# Patient Record
Sex: FEMALE | Race: WHITE | Employment: UNEMPLOYED | URBAN - METROPOLITAN AREA
[De-identification: names, ages, dates, MRNs, and addresses within clinical notes are randomized per-mention and may not be internally consistent; named-entity substitution may affect disease eponyms.]

---

## 2017-04-24 ENCOUNTER — OFFICE VISIT (OUTPATIENT)
Dept: FAMILY MEDICINE CLINIC | Facility: CLINIC | Age: 27
End: 2017-04-24

## 2017-04-24 VITALS
HEART RATE: 94 BPM | OXYGEN SATURATION: 98 % | RESPIRATION RATE: 16 BRPM | TEMPERATURE: 98 F | WEIGHT: 266.38 LBS | SYSTOLIC BLOOD PRESSURE: 110 MMHG | BODY MASS INDEX: 46.61 KG/M2 | HEIGHT: 63.25 IN | DIASTOLIC BLOOD PRESSURE: 80 MMHG

## 2017-04-24 DIAGNOSIS — Z00.00 ROUTINE HEALTH MAINTENANCE: Primary | ICD-10-CM

## 2017-04-24 DIAGNOSIS — D68.4 BLOOD CLOTTING FACTOR DEFICIENCY DISORDER (HCC): ICD-10-CM

## 2017-04-24 DIAGNOSIS — E66.9 NON MORBID OBESITY, UNSPECIFIED OBESITY TYPE: ICD-10-CM

## 2017-04-24 DIAGNOSIS — D22.9 ATYPICAL NEVUS: ICD-10-CM

## 2017-04-24 PROCEDURE — 99395 PREV VISIT EST AGE 18-39: CPT | Performed by: FAMILY MEDICINE

## 2017-04-24 NOTE — PROGRESS NOTES
HPI:   Malena Fernandes is a 27year old female who presents for a complete physical exam. Symptoms: denies discharge, itching, burning or dysuria. Patient complains of having issues with  A mole on the left side of her head it does not bleed.  And also has exertion  CARDIOVASCULAR: denies chest pain on exertion  GI: denies abdominal pain,denies heartburn  : denies dysuria, vaginal discharge or itching,periods regular   MUSCULOSKELETAL: occasional low  back pain, and has right calf pain  NEURO: denies heada CPX in 1 year  Routine health maintenance  (primary encounter diagnosis)  Atypical nevus  Blood clotting factor deficiency disorder (hcc)  Non morbid obesity, unspecified obesity type      Orders Placed This Encounter  CBC, Platelet, No Differential [E]  C

## 2017-04-27 ENCOUNTER — TELEPHONE (OUTPATIENT)
Dept: FAMILY MEDICINE CLINIC | Facility: CLINIC | Age: 27
End: 2017-04-27

## 2017-04-27 ENCOUNTER — NURSE ONLY (OUTPATIENT)
Dept: FAMILY MEDICINE CLINIC | Facility: CLINIC | Age: 27
End: 2017-04-27

## 2017-04-27 DIAGNOSIS — D22.9 ATYPICAL NEVUS: Primary | ICD-10-CM

## 2017-04-27 DIAGNOSIS — Z00.00 ROUTINE HEALTH MAINTENANCE: ICD-10-CM

## 2017-04-27 PROCEDURE — 80053 COMPREHEN METABOLIC PANEL: CPT | Performed by: FAMILY MEDICINE

## 2017-04-27 PROCEDURE — 84443 ASSAY THYROID STIM HORMONE: CPT | Performed by: FAMILY MEDICINE

## 2017-04-27 PROCEDURE — 85027 COMPLETE CBC AUTOMATED: CPT | Performed by: FAMILY MEDICINE

## 2017-04-27 PROCEDURE — 36415 COLL VENOUS BLD VENIPUNCTURE: CPT | Performed by: FAMILY MEDICINE

## 2017-04-27 PROCEDURE — 80061 LIPID PANEL: CPT | Performed by: FAMILY MEDICINE

## 2017-07-03 ENCOUNTER — LAB SERVICES (OUTPATIENT)
Dept: OTHER | Age: 27
End: 2017-07-03

## 2017-07-07 LAB — PATH REPORT: NORMAL

## 2017-07-11 ENCOUNTER — MED REC SCAN ONLY (OUTPATIENT)
Dept: FAMILY MEDICINE CLINIC | Facility: CLINIC | Age: 27
End: 2017-07-11

## 2017-10-18 ENCOUNTER — MED REC SCAN ONLY (OUTPATIENT)
Dept: FAMILY MEDICINE CLINIC | Facility: CLINIC | Age: 27
End: 2017-10-18

## 2017-10-18 ENCOUNTER — IMMUNIZATION (OUTPATIENT)
Dept: FAMILY MEDICINE CLINIC | Facility: CLINIC | Age: 27
End: 2017-10-18

## 2017-10-18 DIAGNOSIS — Z23 NEED FOR VACCINATION: ICD-10-CM

## 2017-10-18 PROCEDURE — 90471 IMMUNIZATION ADMIN: CPT | Performed by: FAMILY MEDICINE

## 2017-10-18 PROCEDURE — 90686 IIV4 VACC NO PRSV 0.5 ML IM: CPT | Performed by: FAMILY MEDICINE

## 2018-04-04 ENCOUNTER — OFFICE VISIT (OUTPATIENT)
Dept: FAMILY MEDICINE CLINIC | Facility: CLINIC | Age: 28
End: 2018-04-04

## 2018-04-04 VITALS
WEIGHT: 266.19 LBS | HEART RATE: 93 BPM | HEIGHT: 64 IN | OXYGEN SATURATION: 99 % | BODY MASS INDEX: 45.44 KG/M2 | TEMPERATURE: 99 F | RESPIRATION RATE: 26 BRPM | DIASTOLIC BLOOD PRESSURE: 72 MMHG | SYSTOLIC BLOOD PRESSURE: 120 MMHG

## 2018-04-04 DIAGNOSIS — Z3A.30 30 WEEKS GESTATION OF PREGNANCY: ICD-10-CM

## 2018-04-04 DIAGNOSIS — R09.81 SINUS CONGESTION: ICD-10-CM

## 2018-04-04 DIAGNOSIS — J06.9 VIRAL URI: Primary | ICD-10-CM

## 2018-04-04 PROCEDURE — 99213 OFFICE O/P EST LOW 20 MIN: CPT | Performed by: FAMILY MEDICINE

## 2018-04-04 NOTE — PROGRESS NOTES
HPI:   Andrea Sewell is a 32year old female who presents for upper respiratory symptoms for  4  days. Patient reports sore throat, congestion, clear colored nasal discharge, low grade fever.  Both her sons are sick with viral URI, denies any significant Visit:    No prescriptions requested or ordered in this encounter       Imaging & Consults:  None

## 2018-04-13 ENCOUNTER — HOSPITAL ENCOUNTER (OUTPATIENT)
Facility: HOSPITAL | Age: 28
Setting detail: OBSERVATION
Discharge: HOME | End: 2018-04-13
Attending: OBSTETRICS & GYNECOLOGY | Admitting: OBSTETRICS & GYNECOLOGY
Payer: COMMERCIAL

## 2018-04-13 VITALS
RESPIRATION RATE: 18 BRPM | HEIGHT: 64 IN | SYSTOLIC BLOOD PRESSURE: 123 MMHG | HEART RATE: 79 BPM | DIASTOLIC BLOOD PRESSURE: 70 MMHG | TEMPERATURE: 98 F

## 2018-04-13 PROBLEM — Z34.90 PREGNANCY (HCC): Status: ACTIVE | Noted: 2018-04-13

## 2018-04-13 PROBLEM — Z34.90 PREGNANCY: Status: ACTIVE | Noted: 2018-04-13

## 2018-04-13 PROCEDURE — 80053 COMPREHEN METABOLIC PANEL: CPT | Performed by: OBSTETRICS & GYNECOLOGY

## 2018-04-13 PROCEDURE — 82150 ASSAY OF AMYLASE: CPT | Performed by: OBSTETRICS & GYNECOLOGY

## 2018-04-13 PROCEDURE — 59025 FETAL NON-STRESS TEST: CPT

## 2018-04-13 PROCEDURE — 96374 THER/PROPH/DIAG INJ IV PUSH: CPT

## 2018-04-13 PROCEDURE — 83690 ASSAY OF LIPASE: CPT | Performed by: OBSTETRICS & GYNECOLOGY

## 2018-04-13 PROCEDURE — 81001 URINALYSIS AUTO W/SCOPE: CPT | Performed by: OBSTETRICS & GYNECOLOGY

## 2018-04-13 PROCEDURE — 81002 URINALYSIS NONAUTO W/O SCOPE: CPT

## 2018-04-13 PROCEDURE — 85025 COMPLETE CBC W/AUTO DIFF WBC: CPT | Performed by: OBSTETRICS & GYNECOLOGY

## 2018-04-13 RX ORDER — ONDANSETRON 2 MG/ML
4 INJECTION INTRAMUSCULAR; INTRAVENOUS EVERY 4 HOURS PRN
Status: DISCONTINUED | OUTPATIENT
Start: 2018-04-13 | End: 2018-04-13

## 2018-04-13 RX ORDER — DEXTROSE, SODIUM CHLORIDE, SODIUM LACTATE, POTASSIUM CHLORIDE, AND CALCIUM CHLORIDE 5; .6; .31; .03; .02 G/100ML; G/100ML; G/100ML; G/100ML; G/100ML
INJECTION, SOLUTION INTRAVENOUS CONTINUOUS
Status: DISCONTINUED | OUTPATIENT
Start: 2018-04-13 | End: 2018-04-13

## 2018-04-13 NOTE — PROGRESS NOTES
2nd urine dip, negative for glucose, dean, ketones,blood,protein,   sp gravity <1.005, ph 6.5, uro 0.2, nit and lec negative.  Pt home ambulatory in no apparent discomfort with her d/c instructions and zofran prescription

## 2018-04-13 NOTE — NST
Nonstress Test   Patient: Elvira Zepeda    Gestation: 33w0d    NST:       Variability: Moderate           Accelerations: Yes           Decelerations: None            Baseline: 130 BPM           Uterine Irritability: No           Contractions: Not presen

## 2018-04-13 NOTE — PROGRESS NOTES
Pt states that \"I feel fine, even the upper abdominal pain is gone since the zofran\" Pt held down the [employer] and iv infusing well at present.  Pt desires discharge home and has already made an appt with Dr Magdaleno Sims for Monday April 16th at 5:30 pm   P

## 2018-04-13 NOTE — PROGRESS NOTES
Pt here with complaints of nausea, vomitting and diarhea since 0600 today. Pt states vomitted \"bile at least 10 times and has loose stool 4-6 times today\"  Pt denies any vaginal bleeding or discharge but does have upper abdominal discomfort.   Unable to l

## 2018-05-31 ENCOUNTER — TELEPHONE (OUTPATIENT)
Dept: OBGYN UNIT | Facility: HOSPITAL | Age: 28
End: 2018-05-31

## 2018-06-03 ENCOUNTER — APPOINTMENT (OUTPATIENT)
Dept: OBGYN CLINIC | Facility: HOSPITAL | Age: 28
End: 2018-06-03
Payer: COMMERCIAL

## 2018-06-03 ENCOUNTER — HOSPITAL ENCOUNTER (INPATIENT)
Facility: HOSPITAL | Age: 28
LOS: 2 days | Discharge: HOME | End: 2018-06-05
Attending: OBSTETRICS & GYNECOLOGY | Admitting: OBSTETRICS & GYNECOLOGY
Payer: COMMERCIAL

## 2018-06-03 PROCEDURE — 86077 PHYS BLOOD BANK SERV XMATCH: CPT | Performed by: OBSTETRICS & GYNECOLOGY

## 2018-06-03 PROCEDURE — 86780 TREPONEMA PALLIDUM: CPT | Performed by: OBSTETRICS & GYNECOLOGY

## 2018-06-03 PROCEDURE — 86701 HIV-1ANTIBODY: CPT | Performed by: OBSTETRICS & GYNECOLOGY

## 2018-06-03 PROCEDURE — 86901 BLOOD TYPING SEROLOGIC RH(D): CPT | Performed by: OBSTETRICS & GYNECOLOGY

## 2018-06-03 PROCEDURE — 81002 URINALYSIS NONAUTO W/O SCOPE: CPT

## 2018-06-03 PROCEDURE — 86900 BLOOD TYPING SEROLOGIC ABO: CPT | Performed by: OBSTETRICS & GYNECOLOGY

## 2018-06-03 PROCEDURE — 86870 RBC ANTIBODY IDENTIFICATION: CPT | Performed by: OBSTETRICS & GYNECOLOGY

## 2018-06-03 PROCEDURE — 85027 COMPLETE CBC AUTOMATED: CPT | Performed by: OBSTETRICS & GYNECOLOGY

## 2018-06-03 PROCEDURE — 86850 RBC ANTIBODY SCREEN: CPT | Performed by: OBSTETRICS & GYNECOLOGY

## 2018-06-03 PROCEDURE — 3E0P7VZ INTRODUCTION OF HORMONE INTO FEMALE REPRODUCTIVE, VIA NATURAL OR ARTIFICIAL OPENING: ICD-10-PCS | Performed by: OBSTETRICS & GYNECOLOGY

## 2018-06-03 RX ORDER — IBUPROFEN 600 MG/1
600 TABLET ORAL ONCE AS NEEDED
Status: DISCONTINUED | OUTPATIENT
Start: 2018-06-03 | End: 2018-06-04 | Stop reason: HOSPADM

## 2018-06-03 RX ORDER — DEXTROSE, SODIUM CHLORIDE, SODIUM LACTATE, POTASSIUM CHLORIDE, AND CALCIUM CHLORIDE 5; .6; .31; .03; .02 G/100ML; G/100ML; G/100ML; G/100ML; G/100ML
INJECTION, SOLUTION INTRAVENOUS AS NEEDED
Status: DISCONTINUED | OUTPATIENT
Start: 2018-06-03 | End: 2018-06-04 | Stop reason: HOSPADM

## 2018-06-03 RX ORDER — ZOLPIDEM TARTRATE 5 MG/1
5 TABLET ORAL NIGHTLY PRN
Status: DISCONTINUED | OUTPATIENT
Start: 2018-06-03 | End: 2018-06-04 | Stop reason: HOSPADM

## 2018-06-03 RX ORDER — SODIUM CHLORIDE, SODIUM LACTATE, POTASSIUM CHLORIDE, CALCIUM CHLORIDE 600; 310; 30; 20 MG/100ML; MG/100ML; MG/100ML; MG/100ML
INJECTION, SOLUTION INTRAVENOUS CONTINUOUS
Status: DISCONTINUED | OUTPATIENT
Start: 2018-06-03 | End: 2018-06-04 | Stop reason: HOSPADM

## 2018-06-03 RX ORDER — TRISODIUM CITRATE DIHYDRATE AND CITRIC ACID MONOHYDRATE 500; 334 MG/5ML; MG/5ML
30 SOLUTION ORAL AS NEEDED
Status: DISCONTINUED | OUTPATIENT
Start: 2018-06-03 | End: 2018-06-04 | Stop reason: HOSPADM

## 2018-06-03 RX ORDER — ONDANSETRON 2 MG/ML
4 INJECTION INTRAMUSCULAR; INTRAVENOUS EVERY 6 HOURS PRN
Status: DISCONTINUED | OUTPATIENT
Start: 2018-06-03 | End: 2018-06-05

## 2018-06-03 RX ORDER — TERBUTALINE SULFATE 1 MG/ML
0.25 INJECTION, SOLUTION SUBCUTANEOUS AS NEEDED
Status: DISCONTINUED | OUTPATIENT
Start: 2018-06-03 | End: 2018-06-04 | Stop reason: HOSPADM

## 2018-06-04 PROCEDURE — 85461 HEMOGLOBIN FETAL: CPT | Performed by: OBSTETRICS & GYNECOLOGY

## 2018-06-04 PROCEDURE — 3E0234Z INTRODUCTION OF SERUM, TOXOID AND VACCINE INTO MUSCLE, PERCUTANEOUS APPROACH: ICD-10-PCS | Performed by: OBSTETRICS & GYNECOLOGY

## 2018-06-04 RX ORDER — EPHEDRINE SULFATE 50 MG/ML
5 INJECTION, SOLUTION INTRAVENOUS AS NEEDED
Status: DISCONTINUED | OUTPATIENT
Start: 2018-06-04 | End: 2018-06-05

## 2018-06-04 RX ORDER — IBUPROFEN 600 MG/1
600 TABLET ORAL EVERY 6 HOURS
Status: DISCONTINUED | OUTPATIENT
Start: 2018-06-04 | End: 2018-06-05

## 2018-06-04 RX ORDER — ACETAMINOPHEN 325 MG/1
650 TABLET ORAL EVERY 6 HOURS PRN
Status: DISCONTINUED | OUTPATIENT
Start: 2018-06-04 | End: 2018-06-05

## 2018-06-04 RX ORDER — DOCUSATE SODIUM 100 MG/1
100 CAPSULE, LIQUID FILLED ORAL
Status: DISCONTINUED | OUTPATIENT
Start: 2018-06-04 | End: 2018-06-05

## 2018-06-04 RX ORDER — NALBUPHINE HCL 10 MG/ML
2.5 AMPUL (ML) INJECTION
Status: DISCONTINUED | OUTPATIENT
Start: 2018-06-04 | End: 2018-06-05

## 2018-06-04 RX ORDER — SIMETHICONE 80 MG
80 TABLET,CHEWABLE ORAL 3 TIMES DAILY PRN
Status: DISCONTINUED | OUTPATIENT
Start: 2018-06-04 | End: 2018-06-05

## 2018-06-04 RX ORDER — BISACODYL 10 MG
10 SUPPOSITORY, RECTAL RECTAL ONCE AS NEEDED
Status: DISCONTINUED | OUTPATIENT
Start: 2018-06-04 | End: 2018-06-05

## 2018-06-04 NOTE — PROGRESS NOTES
Pt up to BR with assistance from this RN. Gait steady. Pt voids without difficulty. Tri-bottle given. Pt denies need for dermoplast at this time. Pt up to [facility] without incident.

## 2018-06-04 NOTE — PROGRESS NOTES
Pt is a 32year old female admitted to 105/105-A, Patient presents with:  Scheduled Induction     Pt is 39w2d intra-uterine pregnancy. Denies any leaking of fluid. Reports +fetal movement. History obtained, consents signed.  Oriented to room, staff, and p

## 2018-06-05 ENCOUNTER — TELEPHONE (OUTPATIENT)
Dept: FAMILY MEDICINE CLINIC | Facility: CLINIC | Age: 28
End: 2018-06-05

## 2018-06-05 VITALS
HEIGHT: 64 IN | HEART RATE: 76 BPM | RESPIRATION RATE: 17 BRPM | DIASTOLIC BLOOD PRESSURE: 61 MMHG | TEMPERATURE: 98 F | WEIGHT: 271 LBS | SYSTOLIC BLOOD PRESSURE: 115 MMHG | OXYGEN SATURATION: 95 % | BODY MASS INDEX: 46.26 KG/M2

## 2018-06-05 PROBLEM — Z34.90 PREGNANCY: Status: RESOLVED | Noted: 2018-04-13 | Resolved: 2018-06-05

## 2018-06-05 PROBLEM — Z34.90 PREGNANCY (HCC): Status: RESOLVED | Noted: 2018-04-13 | Resolved: 2018-06-05

## 2018-06-05 PROCEDURE — 85025 COMPLETE CBC W/AUTO DIFF WBC: CPT | Performed by: OBSTETRICS & GYNECOLOGY

## 2018-06-05 NOTE — L&D DELIVERY NOTE
[facility]    PATIENT'S NAME: Cecy Lockwood   ATTENDING PHYSICIAN: Kassidy Allen D.O.   PATIENT ACCOUNT #: [de-identified] LOCATION:  [address] [place] [address]   MEDICAL RECORD #: [id number] YOB: 1990   ADMISSION DATE: 06/03/2018 DELIVERY DATE: 0

## 2018-06-05 NOTE — TELEPHONE ENCOUNTER
Dad was advised that for any Bilirubin testing needed for  son can be done at [facility] in [facility]. Dad states they have an order and will get testing done 18.   Baby will be in for visit 18

## 2018-06-06 NOTE — DISCHARGE SUMMARY
[address]    PATIENT'S NAME: Carlos Mcmahon   ATTENDING PHYSICIAN: Marjan Lee D.O.   PATIENT ACCOUNT#:   [de-identified]    LOCATION:  [address] [place] [address]  MEDICAL RECORD #:   [id number]       YOB: 1990  ADMISSION DATE:       06/03/201 pain to immediately come back to the hospital and not to operate any motor vehicles for 1 week.      Dictated By Annette Capone D.O.  d: 06/05/2018 07:38:54  t: 06/05/2018 12:38:27  [facility] [id number]  WZ/

## 2018-06-09 ENCOUNTER — TELEPHONE (OUTPATIENT)
Dept: OBGYN UNIT | Facility: HOSPITAL | Age: 28
End: 2018-06-09

## 2018-06-22 ENCOUNTER — TELEPHONE (OUTPATIENT)
Dept: LACTATION | Facility: HOSPITAL | Age: 28
End: 2018-06-22

## 2018-06-22 ENCOUNTER — TELEPHONE (OUTPATIENT)
Dept: FAMILY MEDICINE CLINIC | Facility: CLINIC | Age: 28
End: 2018-06-22

## 2018-06-22 RX ORDER — CEPHALEXIN 500 MG/1
500 CAPSULE ORAL 3 TIMES DAILY
Qty: 30 CAPSULE | Refills: 0 | Status: SHIPPED | OUTPATIENT
Start: 2018-06-22 | End: 2018-07-02

## 2018-06-22 NOTE — TELEPHONE ENCOUNTER
Call from patient. Had baby 2 1/2 weeks ago and is breast feeding. Breasts are painful, red and warm. States it is painful to nurse and feels like burning during and after nursing. Patient has been having chills. Patient thinks she has mastitis.  Wanted to

## 2018-06-22 NOTE — TELEPHONE ENCOUNTER
Called in Keflex 500 mg po tid for 10 days, which is safe to use during breast feeding, she can and should  continue to breast feed and use warm packs

## 2018-06-22 NOTE — TELEPHONE ENCOUNTER
Pt called, she had a baby 2 1/2 weeks ago, has been breast feeding and now thinks she has mastitis. Pt would like Dr. Jl Simms to call her in an antibiotic. Ivis Castro.   Please call pt at [phone]

## 2018-06-25 ENCOUNTER — TELEPHONE (OUTPATIENT)
Dept: FAMILY MEDICINE CLINIC | Facility: CLINIC | Age: 28
End: 2018-06-25

## 2018-06-25 NOTE — TELEPHONE ENCOUNTER
Pt states that she has gotten a little bit of relief and that the baby is nursing and latching well. PT was advised to follow up with lactation and to contact gyne as well.   Pt verbalized understanding

## 2018-06-25 NOTE — TELEPHONE ENCOUNTER
Patient talked with Dr Cristina Fearing last Friday. Patient has been taking the ABX DS prescribed. Patient is still in a lot of pain. Wants to know if this is normal? And what can she do to alleviate some of the pain while nursing.

## 2018-08-02 DIAGNOSIS — B36.9 FUNGAL DERMATITIS: Primary | ICD-10-CM

## 2018-08-02 RX ORDER — NYSTATIN 100000 U/G
CREAM TOPICAL
Qty: 30 G | Refills: 0 | Status: SHIPPED | OUTPATIENT
Start: 2018-08-02 | End: 2020-03-11

## 2019-03-21 ENCOUNTER — TELEPHONE (OUTPATIENT)
Dept: FAMILY MEDICINE CLINIC | Facility: CLINIC | Age: 29
End: 2019-03-21

## 2019-03-21 NOTE — TELEPHONE ENCOUNTER
We can use meclizine 25 mg taken 2 hours prior to  The trip and should cover her for after, but can take a dose 6 hours later if she needs to, for the boys they shouldn't need anything kids generally don;t get motion sickness at that age.

## 2019-03-21 NOTE — TELEPHONE ENCOUNTER
Pt was advised of recommendation- verbalized understanding    Pt asked about OTC product Bonine and if that would be okay to take.   Per verbal with DS yes that would be fine    Pt was advised- verbalized understanding

## 2019-03-21 NOTE — TELEPHONE ENCOUNTER
Pt is going whale watching on vacation and was advised to get anti-nausea medication. Pt is nursing. Pt would like to know what breast feeding safe anti-nausea medication we can recommend or prescribe.   Please all pt at [phone]

## 2019-08-08 LAB
ANTIBODY SCREEN OB: NEGATIVE
HEPATITIS B SURFACE ANTIGEN OB: NEGATIVE
HIV RESULT OB: NEGATIVE
RAPID PLASMA REAGIN OB: NONREACTIVE
RH FACTOR OB: NEGATIVE

## 2019-11-15 ENCOUNTER — MED REC SCAN ONLY (OUTPATIENT)
Dept: FAMILY MEDICINE CLINIC | Facility: CLINIC | Age: 29
End: 2019-11-15

## 2020-02-17 LAB
HIV RESULT OB: NEGATIVE
STREP GP B CULT OB: NEGATIVE

## 2020-03-11 ENCOUNTER — TELEPHONE (OUTPATIENT)
Dept: OBGYN UNIT | Facility: HOSPITAL | Age: 30
End: 2020-03-11

## 2020-03-12 ENCOUNTER — TELEPHONE (OUTPATIENT)
Dept: OBGYN UNIT | Facility: HOSPITAL | Age: 30
End: 2020-03-12

## 2020-05-21 ENCOUNTER — TELEPHONE (OUTPATIENT)
Dept: FAMILY MEDICINE CLINIC | Facility: CLINIC | Age: 30
End: 2020-05-21

## 2020-05-21 RX ORDER — NYSTATIN 100000 U/G
CREAM TOPICAL
Qty: 30 G | Refills: 1 | Status: SHIPPED | OUTPATIENT
Start: 2020-05-21 | End: 2021-07-21 | Stop reason: ALTCHOICE

## 2020-05-21 RX ORDER — NYSTATIN 100000 U/G
CREAM TOPICAL
Qty: 30 G | Refills: 1 | Status: SHIPPED | OUTPATIENT
Start: 2020-05-21 | End: 2020-05-21

## 2020-05-21 NOTE — TELEPHONE ENCOUNTER
Spoke with pt this morning    Son was started on Nystatin for Anusha Bruce    Mom states she is having some irritation- she is having some stinging and her nipples are looking very tender and raw. Mom states she started feeling this about 2 nights ago.   Mom s

## 2020-05-21 NOTE — TELEPHONE ENCOUNTER
Pt states med was supposed to be called into [facility]    DS sent in to [address] [facility] per pt request

## 2020-10-06 ENCOUNTER — TELEPHONE (OUTPATIENT)
Dept: FAMILY MEDICINE CLINIC | Facility: CLINIC | Age: 30
End: 2020-10-06

## 2020-10-06 NOTE — TELEPHONE ENCOUNTER
No future appointments.     Future Appointments   Date Time Provider Teo Stover   10/19/2020  3:00 PM  [facility] [place]2Nd [facility] Brice Mahan

## 2020-10-19 ENCOUNTER — IMMUNIZATION (OUTPATIENT)
Dept: FAMILY MEDICINE CLINIC | Facility: CLINIC | Age: 30
End: 2020-10-19
Payer: COMMERCIAL

## 2020-10-19 DIAGNOSIS — Z23 NEED FOR VACCINATION: ICD-10-CM

## 2020-10-19 PROCEDURE — 90471 IMMUNIZATION ADMIN: CPT | Performed by: FAMILY MEDICINE

## 2020-10-19 PROCEDURE — 90686 IIV4 VACC NO PRSV 0.5 ML IM: CPT | Performed by: FAMILY MEDICINE

## 2021-05-27 ENCOUNTER — TELEPHONE (OUTPATIENT)
Dept: FAMILY MEDICINE CLINIC | Facility: CLINIC | Age: 31
End: 2021-05-27

## 2021-05-27 NOTE — TELEPHONE ENCOUNTER
Lets give it more time, take some adult 12 hour sudafed and if sx worsen or sooner if  Develops fever to call

## 2021-05-27 NOTE — TELEPHONE ENCOUNTER
Dad called Pt has drainage, sore throat, and drainage.  Dad is wanting to know if the dr could send something in for PT?

## 2021-05-27 NOTE — TELEPHONE ENCOUNTER
LOV with DS 04/2018    Pt states she has cough, congestion, green drainage and ST- since monday    Denies covid Contact    No temp    Looking for meds called in? Want VV?

## 2021-05-28 ENCOUNTER — TELEPHONE (OUTPATIENT)
Dept: FAMILY MEDICINE CLINIC | Facility: CLINIC | Age: 31
End: 2021-05-28

## 2021-05-28 NOTE — TELEPHONE ENCOUNTER
SPOUSE CALLED AND ADV THAT PT SPIKED A FEVER LAST NIGHT .8. SPOKE WITH DR MORIN [facility] YESTERDAY AND WOULD LIKE TO KNOW IF PT SHOULD BE PLACED ON AN ANTIBIOTIC?     PLEASE ADV      THANK YOU

## 2021-05-28 NOTE — TELEPHONE ENCOUNTER
I cannot tell for sure if she needs an anti-biotic without testing her or if there's a concern for COVID / other viral syndrome.   If spouse if particular about an antibiotic an in-person visit in the [address] [place] [address] would make more sense because they can test her for s

## 2021-05-28 NOTE — TELEPHONE ENCOUNTER
FYI    Patient's spouse, Markus Li, advised of Doctor's note below. He reports he had some family members see Dr. Cari Guzman for the same symptoms and were treated with abx, was told to call back if pt develops fevers.  Markus Li advised for pt to go to [facility] or [facility], as best

## 2021-07-21 ENCOUNTER — OFFICE VISIT (OUTPATIENT)
Dept: FAMILY MEDICINE CLINIC | Facility: CLINIC | Age: 31
End: 2021-07-21
Payer: COMMERCIAL

## 2021-07-21 VITALS
BODY MASS INDEX: 44.95 KG/M2 | TEMPERATURE: 97 F | HEART RATE: 88 BPM | SYSTOLIC BLOOD PRESSURE: 100 MMHG | DIASTOLIC BLOOD PRESSURE: 72 MMHG | WEIGHT: 269.81 LBS | RESPIRATION RATE: 24 BRPM | HEIGHT: 65 IN

## 2021-07-21 DIAGNOSIS — Z00.00 ROUTINE HEALTH MAINTENANCE: Primary | ICD-10-CM

## 2021-07-21 PROCEDURE — 3074F SYST BP LT 130 MM HG: CPT | Performed by: FAMILY MEDICINE

## 2021-07-21 PROCEDURE — 99395 PREV VISIT EST AGE 18-39: CPT | Performed by: FAMILY MEDICINE

## 2021-07-21 PROCEDURE — 3078F DIAST BP <80 MM HG: CPT | Performed by: FAMILY MEDICINE

## 2021-07-21 PROCEDURE — 3008F BODY MASS INDEX DOCD: CPT | Performed by: FAMILY MEDICINE

## 2021-07-21 RX ORDER — CEPHALEXIN 500 MG/1
500 CAPSULE ORAL 4 TIMES DAILY
COMMUNITY
Start: 2021-06-30 | End: 2021-07-21 | Stop reason: ALTCHOICE

## 2021-07-21 NOTE — PROGRESS NOTES
HPI:   Eamon Louis is a 26year old female who presents for a complete physical exam. Symptoms: denies discharge, itching, burning or dysuria. Patient complains of nothing at this time.  Her GM tested positive for breast Ca, her mom and sister were [facility] History   Problem Relation Age of Onset   • Psychiatric Mother         anxiety   • Cancer Mother    • Hypertension Mother       Social History:   Social History    Tobacco Use      Smoking status: Never Smoker      Smokeless tobacco: Never Used    Alcohol physical exam. NO Order put in for mammogram and dexascan. Self breast exam explained. Health maintenance, will check fasting Lipids, CMP, and CBC. exercise, low fat diet and breast self-exam. Pt' s weight is Body mass index is 44.9 kg/m². , recommended low

## 2021-08-23 ENCOUNTER — TELEPHONE (OUTPATIENT)
Dept: FAMILY MEDICINE CLINIC | Facility: CLINIC | Age: 31
End: 2021-08-23

## 2022-01-19 ENCOUNTER — TELEPHONE (OUTPATIENT)
Dept: FAMILY MEDICINE CLINIC | Facility: CLINIC | Age: 32
End: 2022-01-19

## 2022-01-19 NOTE — TELEPHONE ENCOUNTER
Pt called states started taking a women multi vitamin and turned her urine a high lighter color very bright pt is wondering if she should keep taking or if that is a sign she is taking too much B12 more than she should       Pt call back # [phone]

## 2022-01-19 NOTE — TELEPHONE ENCOUNTER
Pt was advised that B12 in multi vitamins can turn your urine a very bright shade of yellow- this is normal    Stay hydrated    If you would start to have sxs of pain, burning or urgency please let us know and we can test urine.

## 2022-07-05 ENCOUNTER — TELEPHONE (OUTPATIENT)
Dept: FAMILY MEDICINE CLINIC | Facility: CLINIC | Age: 32
End: 2022-07-05

## 2022-07-05 RX ORDER — AMOXICILLIN AND CLAVULANATE POTASSIUM 500; 125 MG/1; MG/1
1 TABLET, FILM COATED ORAL 2 TIMES DAILY
Qty: 20 TABLET | Refills: 0 | Status: SHIPPED | OUTPATIENT
Start: 2022-07-05 | End: 2022-07-15

## 2022-07-05 NOTE — TELEPHONE ENCOUNTER
Pt states sinus pressure- ear pressure and pain X3 days    Pt stats she is able to blow hernose and drainage is discolored. She states she was yawning a few minutes ago and ear popped and it made it really dizzy    Pt states she did take a Zyrtec last night- did not seem to help much    Pt denies temp    She did have a Sore throat on day 1, but it has resolved. Home COVID this morning was negative    Looking for recommendations.

## 2022-07-05 NOTE — TELEPHONE ENCOUNTER
PT CALLED AND ADV THINKS SHE HAS SINUS INFECTION. CONGESTION X 3 DAYS - HOME COVID TEST NEGATIVE.     NO FEVER     LOOKING FOR RECOMMENDATIONS     PLEASE ADV        [facility]    THANK YOU

## 2022-07-05 NOTE — TELEPHONE ENCOUNTER
Sounds like she has a sinus infection, can send in an ABX for her, use some flonase 2 puffs in each side once a day and some plain Mucinex,

## 2023-11-03 ENCOUNTER — HOSPITAL ENCOUNTER (OUTPATIENT)
Age: 33
Discharge: HOME | End: 2023-11-03
Payer: COMMERCIAL

## 2023-11-03 VITALS
RESPIRATION RATE: 18 BRPM | DIASTOLIC BLOOD PRESSURE: 87 MMHG | BODY MASS INDEX: 37.56 KG/M2 | HEART RATE: 94 BPM | HEIGHT: 64 IN | SYSTOLIC BLOOD PRESSURE: 115 MMHG | WEIGHT: 220 LBS | TEMPERATURE: 97 F | OXYGEN SATURATION: 100 %

## 2023-11-03 DIAGNOSIS — R09.81 NASAL CONGESTION: ICD-10-CM

## 2023-11-03 DIAGNOSIS — H66.002 NON-RECURRENT ACUTE SUPPURATIVE OTITIS MEDIA OF LEFT EAR WITHOUT SPONTANEOUS RUPTURE OF TYMPANIC MEMBRANE: Primary | ICD-10-CM

## 2023-11-03 RX ORDER — FLUTICASONE PROPIONATE 50 MCG
2 SPRAY, SUSPENSION (ML) NASAL DAILY
Qty: 16 G | Refills: 0 | Status: SHIPPED | OUTPATIENT
Start: 2023-11-03 | End: 2023-12-03

## 2023-11-03 RX ORDER — GARLIC EXTRACT 500 MG
1 CAPSULE ORAL DAILY
COMMUNITY

## 2023-11-03 RX ORDER — AMOXICILLIN AND CLAVULANATE POTASSIUM 875; 125 MG/1; MG/1
1 TABLET, FILM COATED ORAL 2 TIMES DAILY
Qty: 20 TABLET | Refills: 0 | Status: SHIPPED | OUTPATIENT
Start: 2023-11-03 | End: 2023-11-13

## 2023-11-03 RX ORDER — MULTIVITAMIN
TABLET ORAL
COMMUNITY

## 2023-11-03 NOTE — DISCHARGE INSTRUCTIONS
Zyrtec in the morning, Benadryl at night. Flonase twice daily for the next week. Sudafed, purchased directly from the pharmacist.  Start antibiotic immediately.

## 2024-11-21 ENCOUNTER — MED REC SCAN ONLY (OUTPATIENT)
Dept: FAMILY MEDICINE CLINIC | Facility: CLINIC | Age: 34
End: 2024-11-21

## 2025-02-11 ENCOUNTER — TELEPHONE (OUTPATIENT)
Dept: FAMILY MEDICINE CLINIC | Facility: CLINIC | Age: 35
End: 2025-02-11

## 2025-02-11 NOTE — TELEPHONE ENCOUNTER
PT CALLED AND ADV THAT SON HAS BARKING COUGH AND SHE NOW HAS COUGH    WAS ADV THAT PT RECEIVED EMAIL FROM SCHOOL THAT PERTUSSIS WAS GOING AROUND    LOOKING FOR RECOMMENDATIONS    PLEASE ADV    THANK YOU    ERIK KARIMI

## 2025-02-11 NOTE — TELEPHONE ENCOUNTER
Could be something viral try soime dimetapp, or robitussin and see if that helps also EVETTE last Tdap was 6/2024

## (undated) NOTE — MR AVS SNAPSHOT
[address] [facility] [id number]  [phone]               Thank you for choosing us for your health care visit with Olamide Pradhan DO.   We are glad to serve you and happy to provide you with this sum Sign up for [employer], your secure online medical record. [employer] will allow you to access patient instructions from your recent visit,  view other health information, and more. To sign up or find more information, go to [email or web address] and cl increments are effective and add up over the week   2 ½ hours per week – spread out over time Use a bebo to keep you motivated   Don’t forget strength training with weights and resistance Set goals and track your progress   You don’t need to join a gym.